# Patient Record
Sex: FEMALE | Race: WHITE | NOT HISPANIC OR LATINO
[De-identification: names, ages, dates, MRNs, and addresses within clinical notes are randomized per-mention and may not be internally consistent; named-entity substitution may affect disease eponyms.]

---

## 2019-06-11 PROBLEM — Z00.00 ENCOUNTER FOR PREVENTIVE HEALTH EXAMINATION: Status: ACTIVE | Noted: 2019-06-11

## 2019-06-26 ENCOUNTER — APPOINTMENT (OUTPATIENT)
Dept: NEUROLOGY | Facility: CLINIC | Age: 49
End: 2019-06-26

## 2019-07-31 ENCOUNTER — APPOINTMENT (OUTPATIENT)
Dept: NEUROLOGY | Facility: CLINIC | Age: 49
End: 2019-07-31
Payer: COMMERCIAL

## 2019-07-31 VITALS
DIASTOLIC BLOOD PRESSURE: 81 MMHG | HEIGHT: 62 IN | BODY MASS INDEX: 24.66 KG/M2 | WEIGHT: 134 LBS | TEMPERATURE: 98.2 F | SYSTOLIC BLOOD PRESSURE: 113 MMHG | HEART RATE: 81 BPM

## 2019-07-31 DIAGNOSIS — Z87.891 PERSONAL HISTORY OF NICOTINE DEPENDENCE: ICD-10-CM

## 2019-07-31 DIAGNOSIS — Z86.018 PERSONAL HISTORY OF OTHER BENIGN NEOPLASM: ICD-10-CM

## 2019-07-31 PROCEDURE — 99204 OFFICE O/P NEW MOD 45 MIN: CPT

## 2019-07-31 NOTE — DISCUSSION/SUMMARY
[FreeTextEntry1] : I  reviewed    the    recent   MRI of  the    brain,  which     was   nl  (done   in   2018)\par Would    like   too  refer    to   MRI    of    the   brain   with  and  without   contrast    (pituitary)\par Labs\par She    will  be  referred  to  Endocrinologist\par Paxil  (pt  is  tremaine-  menopausal,  reports     irritability)\par r/o  sz,  h/o    meningioma  resection.  Will  need    an   EEG\par

## 2019-07-31 NOTE — HISTORY OF PRESENT ILLNESS
[FreeTextEntry1] : Pt   presented    for     evaluation  of   recently   noticed   increased  Prolactin   level.  She    also  reports    irritability.   There    is  a  h/o    meningioma   resection   a    few    year s   ago.  No   seizures.  She    states    that prior    to    the   diagnosis    of   Meningioma    she    has  been    experiencing  an   episodes of     dizziness   and   memory  issues.  No   current   events     suspicious     for    seizures.

## 2019-07-31 NOTE — REVIEW OF SYSTEMS
[Dizziness] : dizziness [Tension Headache] : tension-type headaches [Palpitations] : palpitations [Negative] : Heme/Lymph [Joint Swelling] : joint swelling [Joint Pain] : joint pain [FreeTextEntry2] : night sweats [FreeTextEntry9] : neck pain,muscle aches,back pain [de-identified] : off balance [de-identified] : hair loss

## 2019-07-31 NOTE — PHYSICAL EXAM
[General Appearance - Alert] : alert [Oriented To Time, Place, And Person] : oriented to person, place, and time [General Appearance - In No Acute Distress] : in no acute distress [Affect] : the affect was normal [Impaired Insight] : insight and judgment were intact [Person] : oriented to person [Place] : oriented to place [Concentration Intact] : normal concentrating ability [Time] : oriented to time [Visual Intact] : visual attention was ~T not ~L decreased [Naming Objects] : no difficulty naming common objects [Repeating Phrases] : no difficulty repeating a phrase [Writing A Sentence] : no difficulty writing a sentence [Fluency] : fluency intact [Comprehension] : comprehension intact [Past History] : adequate knowledge of personal past history [Reading] : reading intact [Cranial Nerves Trigeminal (V)] : facial sensation intact symmetrically [Cranial Nerves Optic (II)] : visual acuity intact bilaterally,  visual fields full to confrontation, pupils equal round and reactive to light [Cranial Nerves Oculomotor (III)] : extraocular motion intact [Cranial Nerves Facial (VII)] : face symmetrical [Cranial Nerves Vestibulocochlear (VIII)] : hearing was intact bilaterally [Cranial Nerves Glossopharyngeal (IX)] : tongue and palate midline [Cranial Nerves Hypoglossal (XII)] : there was no tongue deviation with protrusion [Cranial Nerves Accessory (XI - Cranial And Spinal)] : head turning and shoulder shrug symmetric [Motor Tone] : muscle tone was normal in all four extremities [Motor Strength] : muscle strength was normal in all four extremities [No Muscle Atrophy] : normal bulk in all four extremities [Sensation Tactile Decrease] : light touch was intact [Abnormal Walk] : normal gait [Balance] : balance was intact [2+] : Ankle jerk left 2+ [Past-pointing] : there was no past-pointing [Tremor] : no tremor present [Plantar Reflex Right Only] : normal on the right [Plantar Reflex Left Only] : normal on the left

## 2019-08-01 LAB
PROLACTIN SERPL-MCNC: 25.9 NG/ML
TSH SERPL-ACNC: 2.18 UIU/ML

## 2019-08-09 ENCOUNTER — APPOINTMENT (OUTPATIENT)
Dept: NEUROLOGY | Facility: CLINIC | Age: 49
End: 2019-08-09
Payer: COMMERCIAL

## 2019-08-09 PROCEDURE — 95819 EEG AWAKE AND ASLEEP: CPT

## 2019-08-10 PROCEDURE — 95953: CPT

## 2019-08-11 PROCEDURE — 95953: CPT

## 2019-08-12 ENCOUNTER — APPOINTMENT (OUTPATIENT)
Dept: NEUROLOGY | Facility: CLINIC | Age: 49
End: 2019-08-12

## 2019-09-04 ENCOUNTER — APPOINTMENT (OUTPATIENT)
Dept: NEUROLOGY | Facility: CLINIC | Age: 49
End: 2019-09-04
Payer: COMMERCIAL

## 2019-09-04 VITALS
HEIGHT: 62 IN | HEART RATE: 94 BPM | SYSTOLIC BLOOD PRESSURE: 113 MMHG | BODY MASS INDEX: 24.66 KG/M2 | WEIGHT: 134 LBS | DIASTOLIC BLOOD PRESSURE: 71 MMHG | TEMPERATURE: 98.2 F

## 2019-09-04 PROCEDURE — 99215 OFFICE O/P EST HI 40 MIN: CPT

## 2019-09-04 NOTE — DISCUSSION/SUMMARY
[FreeTextEntry1] : I  reviewed  the   MRI of the  brain, EEG, labs   and  discussed  the  findings   with  the  patient.  Prolactin level  decreased.  There   is  no  evidence  of   pituitary   lesion on  MRI\par EEG is   nl\par Suspect  that    cognitive  issues  are  attributed    to  mood,,  now  improved\par Would   repeat   an MRI in 12  months,  prolactin    level  follow  up  in  3   months.

## 2019-09-04 NOTE — PHYSICAL EXAM
[General Appearance - Alert] : alert [General Appearance - In No Acute Distress] : in no acute distress [Oriented To Time, Place, And Person] : oriented to person, place, and time [Impaired Insight] : insight and judgment were intact [Affect] : the affect was normal [Person] : oriented to person [Place] : oriented to place [Time] : oriented to time [Concentration Intact] : normal concentrating ability [Visual Intact] : visual attention was ~T not ~L decreased [Naming Objects] : no difficulty naming common objects [Repeating Phrases] : no difficulty repeating a phrase [Writing A Sentence] : no difficulty writing a sentence [Fluency] : fluency intact [Comprehension] : comprehension intact [Reading] : reading intact [Past History] : adequate knowledge of personal past history [Cranial Nerves Optic (II)] : visual acuity intact bilaterally,  visual fields full to confrontation, pupils equal round and reactive to light [Cranial Nerves Oculomotor (III)] : extraocular motion intact [Cranial Nerves Trigeminal (V)] : facial sensation intact symmetrically [Cranial Nerves Vestibulocochlear (VIII)] : hearing was intact bilaterally [Cranial Nerves Facial (VII)] : face symmetrical [Cranial Nerves Glossopharyngeal (IX)] : tongue and palate midline [Cranial Nerves Accessory (XI - Cranial And Spinal)] : head turning and shoulder shrug symmetric [Cranial Nerves Hypoglossal (XII)] : there was no tongue deviation with protrusion [Motor Tone] : muscle tone was normal in all four extremities [Motor Strength] : muscle strength was normal in all four extremities [No Muscle Atrophy] : normal bulk in all four extremities [Sensation Tactile Decrease] : light touch was intact [Abnormal Walk] : normal gait [Balance] : balance was intact [2+] : Ankle jerk left 2+ [Past-pointing] : there was no past-pointing [Tremor] : no tremor present [Plantar Reflex Right Only] : normal on the right [Plantar Reflex Left Only] : normal on the left

## 2019-09-04 NOTE — HISTORY OF PRESENT ILLNESS
[FreeTextEntry1] : Presented   to     follow  up   test   results    and    further  management.   She    states  that  she    continues   experiencing    an   episodes   of     dizziness   and    occasional  memory   issues. She started    taking  Paxil  and   states     that   mood  is   much  better. Prolactin  level  is   25.9.  TSH  was   nl.\par MRI   of the   brain  on    08/09/2019  did  not  reveal   pituitary   or    hypothalamic   lesion.  Status  post   left   frontal   craniotomy.  There  is   no   recurrent   meningoma. Manjeet Castillo,  Radiologist. EEG (routine   and  amb)  were    normal.

## 2019-09-04 NOTE — PHYSICAL EXAM
[General Appearance - Alert] : alert [General Appearance - In No Acute Distress] : in no acute distress [Oriented To Time, Place, And Person] : oriented to person, place, and time [Impaired Insight] : insight and judgment were intact [Affect] : the affect was normal [Person] : oriented to person [Place] : oriented to place [Time] : oriented to time [Visual Intact] : visual attention was ~T not ~L decreased [Concentration Intact] : normal concentrating ability [Naming Objects] : no difficulty naming common objects [Repeating Phrases] : no difficulty repeating a phrase [Writing A Sentence] : no difficulty writing a sentence [Fluency] : fluency intact [Comprehension] : comprehension intact [Reading] : reading intact [Past History] : adequate knowledge of personal past history [Cranial Nerves Optic (II)] : visual acuity intact bilaterally,  visual fields full to confrontation, pupils equal round and reactive to light [Cranial Nerves Oculomotor (III)] : extraocular motion intact [Cranial Nerves Trigeminal (V)] : facial sensation intact symmetrically [Cranial Nerves Vestibulocochlear (VIII)] : hearing was intact bilaterally [Cranial Nerves Facial (VII)] : face symmetrical [Cranial Nerves Glossopharyngeal (IX)] : tongue and palate midline [Cranial Nerves Accessory (XI - Cranial And Spinal)] : head turning and shoulder shrug symmetric [Cranial Nerves Hypoglossal (XII)] : there was no tongue deviation with protrusion [Motor Strength] : muscle strength was normal in all four extremities [Motor Tone] : muscle tone was normal in all four extremities [Sensation Tactile Decrease] : light touch was intact [No Muscle Atrophy] : normal bulk in all four extremities [Balance] : balance was intact [Abnormal Walk] : normal gait [2+] : Ankle jerk left 2+ [Past-pointing] : there was no past-pointing [Tremor] : no tremor present [Plantar Reflex Right Only] : normal on the right [Plantar Reflex Left Only] : normal on the left

## 2020-01-08 ENCOUNTER — APPOINTMENT (OUTPATIENT)
Dept: NEUROLOGY | Facility: CLINIC | Age: 50
End: 2020-01-08
Payer: COMMERCIAL

## 2020-01-08 VITALS
HEART RATE: 80 BPM | BODY MASS INDEX: 25.76 KG/M2 | WEIGHT: 140 LBS | SYSTOLIC BLOOD PRESSURE: 98 MMHG | DIASTOLIC BLOOD PRESSURE: 73 MMHG | HEIGHT: 62 IN

## 2020-01-08 DIAGNOSIS — F33.9 MAJOR DEPRESSIVE DISORDER, RECURRENT, UNSPECIFIED: ICD-10-CM

## 2020-01-08 DIAGNOSIS — R45.4 IRRITABILITY AND ANGER: ICD-10-CM

## 2020-01-08 PROCEDURE — 99215 OFFICE O/P EST HI 40 MIN: CPT

## 2020-01-08 NOTE — PHYSICAL EXAM
[General Appearance - Alert] : alert [General Appearance - In No Acute Distress] : in no acute distress [Impaired Insight] : insight and judgment were intact [Oriented To Time, Place, And Person] : oriented to person, place, and time [Affect] : the affect was normal [Person] : oriented to person [Place] : oriented to place [Time] : oriented to time [Concentration Intact] : normal concentrating ability [Visual Intact] : visual attention was ~T not ~L decreased [Naming Objects] : no difficulty naming common objects [Repeating Phrases] : no difficulty repeating a phrase [Writing A Sentence] : no difficulty writing a sentence [Comprehension] : comprehension intact [Fluency] : fluency intact [Reading] : reading intact [Past History] : adequate knowledge of personal past history [Cranial Nerves Optic (II)] : visual acuity intact bilaterally,  visual fields full to confrontation, pupils equal round and reactive to light [Cranial Nerves Oculomotor (III)] : extraocular motion intact [Cranial Nerves Trigeminal (V)] : facial sensation intact symmetrically [Cranial Nerves Facial (VII)] : face symmetrical [Cranial Nerves Vestibulocochlear (VIII)] : hearing was intact bilaterally [Cranial Nerves Glossopharyngeal (IX)] : tongue and palate midline [Cranial Nerves Accessory (XI - Cranial And Spinal)] : head turning and shoulder shrug symmetric [Cranial Nerves Hypoglossal (XII)] : there was no tongue deviation with protrusion [Motor Tone] : muscle tone was normal in all four extremities [No Muscle Atrophy] : normal bulk in all four extremities [Motor Strength] : muscle strength was normal in all four extremities [Sensation Tactile Decrease] : light touch was intact [Abnormal Walk] : normal gait [Balance] : balance was intact [2+] : Ankle jerk left 2+ [Past-pointing] : there was no past-pointing [Tremor] : no tremor present [Plantar Reflex Right Only] : normal on the right [Plantar Reflex Left Only] : normal on the left

## 2020-01-08 NOTE — PHYSICAL EXAM
[General Appearance - Alert] : alert [General Appearance - In No Acute Distress] : in no acute distress [Impaired Insight] : insight and judgment were intact [Oriented To Time, Place, And Person] : oriented to person, place, and time [Person] : oriented to person [Affect] : the affect was normal [Place] : oriented to place [Concentration Intact] : normal concentrating ability [Time] : oriented to time [Visual Intact] : visual attention was ~T not ~L decreased [Naming Objects] : no difficulty naming common objects [Writing A Sentence] : no difficulty writing a sentence [Repeating Phrases] : no difficulty repeating a phrase [Comprehension] : comprehension intact [Fluency] : fluency intact [Past History] : adequate knowledge of personal past history [Reading] : reading intact [Cranial Nerves Trigeminal (V)] : facial sensation intact symmetrically [Cranial Nerves Optic (II)] : visual acuity intact bilaterally,  visual fields full to confrontation, pupils equal round and reactive to light [Cranial Nerves Oculomotor (III)] : extraocular motion intact [Cranial Nerves Glossopharyngeal (IX)] : tongue and palate midline [Cranial Nerves Facial (VII)] : face symmetrical [Cranial Nerves Vestibulocochlear (VIII)] : hearing was intact bilaterally [Cranial Nerves Accessory (XI - Cranial And Spinal)] : head turning and shoulder shrug symmetric [Cranial Nerves Hypoglossal (XII)] : there was no tongue deviation with protrusion [Motor Tone] : muscle tone was normal in all four extremities [No Muscle Atrophy] : normal bulk in all four extremities [Motor Strength] : muscle strength was normal in all four extremities [Abnormal Walk] : normal gait [Sensation Tactile Decrease] : light touch was intact [Balance] : balance was intact [2+] : Patella left 2+ [Past-pointing] : there was no past-pointing [Tremor] : no tremor present [Plantar Reflex Right Only] : normal on the right [Plantar Reflex Left Only] : normal on the left

## 2020-01-08 NOTE — PHYSICAL EXAM
[General Appearance - Alert] : alert [General Appearance - In No Acute Distress] : in no acute distress [Oriented To Time, Place, And Person] : oriented to person, place, and time [Impaired Insight] : insight and judgment were intact [Person] : oriented to person [Affect] : the affect was normal [Place] : oriented to place [Time] : oriented to time [Concentration Intact] : normal concentrating ability [Visual Intact] : visual attention was ~T not ~L decreased [Naming Objects] : no difficulty naming common objects [Writing A Sentence] : no difficulty writing a sentence [Repeating Phrases] : no difficulty repeating a phrase [Comprehension] : comprehension intact [Fluency] : fluency intact [Reading] : reading intact [Past History] : adequate knowledge of personal past history [Cranial Nerves Trigeminal (V)] : facial sensation intact symmetrically [Cranial Nerves Oculomotor (III)] : extraocular motion intact [Cranial Nerves Optic (II)] : visual acuity intact bilaterally,  visual fields full to confrontation, pupils equal round and reactive to light [Cranial Nerves Vestibulocochlear (VIII)] : hearing was intact bilaterally [Cranial Nerves Facial (VII)] : face symmetrical [Cranial Nerves Glossopharyngeal (IX)] : tongue and palate midline [Cranial Nerves Hypoglossal (XII)] : there was no tongue deviation with protrusion [Motor Tone] : muscle tone was normal in all four extremities [Cranial Nerves Accessory (XI - Cranial And Spinal)] : head turning and shoulder shrug symmetric [No Muscle Atrophy] : normal bulk in all four extremities [Motor Strength] : muscle strength was normal in all four extremities [Sensation Tactile Decrease] : light touch was intact [Abnormal Walk] : normal gait [Balance] : balance was intact [2+] : Ankle jerk left 2+ [Past-pointing] : there was no past-pointing [Tremor] : no tremor present [Plantar Reflex Right Only] : normal on the right [Plantar Reflex Left Only] : normal on the left

## 2020-01-08 NOTE — DISCUSSION/SUMMARY
[FreeTextEntry1] : It    appears    that  the    patient's  cognitive complaints  are    related    to the    mood  issues. She    reports     a  h/o  depression;  haven't  been    seen   by    the    mood    specialist    for    a  while.\par She    will  be    referred    to  Psychiatrist  and   Psychologist.  She    has   an  appointment     with   Endocrinologist.

## 2020-01-09 LAB — PROLACTIN SERPL-MCNC: 30.2 NG/ML

## 2020-02-14 ENCOUNTER — APPOINTMENT (OUTPATIENT)
Dept: NEUROLOGY | Facility: CLINIC | Age: 50
End: 2020-02-14
Payer: COMMERCIAL

## 2020-02-14 VITALS
SYSTOLIC BLOOD PRESSURE: 117 MMHG | HEIGHT: 62 IN | DIASTOLIC BLOOD PRESSURE: 80 MMHG | WEIGHT: 140 LBS | TEMPERATURE: 98 F | BODY MASS INDEX: 25.76 KG/M2 | HEART RATE: 73 BPM

## 2020-02-14 PROCEDURE — 99213 OFFICE O/P EST LOW 20 MIN: CPT

## 2020-02-14 NOTE — PHYSICAL EXAM
[FreeTextEntry1] : Physical examination \par General: No acute distress, Awake, Alert.   \par \par Mental status \par Awake, alert, appropriate.  Gives detailed history.\par \par Cranial Nerves \par II: VFF  \par III, IV, VI: PERRL, EOMI.   \par VII: Facial strength is normal B/L. \par VIII: Gross hearing is intact.   \par IX, X: Palate is midline and elevates symmetrically.   \par XI: Trapezius normal strength.   \par XII: Tongue midline without atrophy or fasciculations. \par \par Motor exam  \par Muscle tone - no evidence of rigidity or resistance in all 4 extremities.  \par No atrophy or fasciculations \par Muscle Strength: arms and legs, proximal and distal flexors and extensors are normal \par No UE drift.\par \par Reflexes \par diffuse hyporeflexia \par \par \par Coordination \par Finger to nose: Normal.  \par Heel to shin: Normal.   \par \par \par Gait \par Normal including heels, toes, and tandem gait.  \par \par \par

## 2020-02-14 NOTE — ASSESSMENT
[FreeTextEntry1] : Ms. Hatfield is a 49-year-old woman who had resection of a meningioma 5 years ago.  No recurrent meningioma on the most recent MRI brain with and without contrast performed in August of 2019. I recommend a consultation with neurosurgery to recommend how often she should have followup MRIs.  She has no history of seizures and has a normal routine and ambulatory EEG.\par \par Mildly elevated prolactin level with no galactorrhea and no abnormality in the pituitary on MRI. Endocrine consult.\par \par Depression and anxiety - she is seeing a psychiatrist.\par \par I spent over 15 minutes with the patient and over 50% of that time was spent counseling, coordination of care and answering questions.\par

## 2020-02-14 NOTE — DATA REVIEWED
[de-identified] : January 8, 2020 - Prolactin level 30.2\par August 9, 2019\par MRI brain and pituitary with and without contrast - there is no pituitary or hypothalamic lesion. Status post left frontal craniotomy. There is no recurrent meningioma.\par August 2019 routine and ambulatory EEG – normal.\par

## 2020-02-14 NOTE — HISTORY OF PRESENT ILLNESS
[FreeTextEntry1] : This is a followup visit for Ms. Hatfield who is a 49-year-old woman who had resection of a meningioma 5 years ago. She has never had a seizure.  She has depression and sees a psychiatrist. She has had an elevated prolactin level but no galactorrhea.\par \par She used to be a gymnast.

## 2020-02-14 NOTE — CONSULT LETTER
[Dear  ___] : Dear  [unfilled], [FreeTextEntry1] : I had the pleasure of evaluating your patient, SETH GUZMAN. Please see the assessment section below for a summary of my diagnostic impression and plan.\par \par Thank you very much for allowing me to participate in the care of this patient. If you have any questions, please do not hesitate to contact me. \par \par Sincerely,\par \par Jeniffer Wren MD\par

## 2020-02-20 ENCOUNTER — APPOINTMENT (OUTPATIENT)
Dept: ENDOCRINOLOGY | Facility: CLINIC | Age: 50
End: 2020-02-20
Payer: COMMERCIAL

## 2020-02-20 VITALS
HEIGHT: 62 IN | DIASTOLIC BLOOD PRESSURE: 68 MMHG | HEART RATE: 79 BPM | WEIGHT: 138 LBS | BODY MASS INDEX: 25.4 KG/M2 | OXYGEN SATURATION: 97 % | SYSTOLIC BLOOD PRESSURE: 98 MMHG

## 2020-02-20 DIAGNOSIS — E03.9 HYPOTHYROIDISM, UNSPECIFIED: ICD-10-CM

## 2020-02-20 DIAGNOSIS — E22.9 HYPERFUNCTION OF PITUITARY GLAND, UNSPECIFIED: ICD-10-CM

## 2020-02-20 PROCEDURE — 99203 OFFICE O/P NEW LOW 30 MIN: CPT | Mod: 25

## 2020-02-20 PROCEDURE — 36415 COLL VENOUS BLD VENIPUNCTURE: CPT

## 2020-02-27 NOTE — PHYSICAL EXAM
[Alert] : alert [No Acute Distress] : no acute distress [Thyroid Not Enlarged] : the thyroid was not enlarged [Healthy Appearance] : healthy appearance [Normal Voice/Communication] : normal voice communication [Regular Rhythm] : with a regular rhythm [No Involuntary Movements] : no involuntary movements were seen [No Rash] : no rash [Oriented x3] : oriented to person, place, and time [Normal Insight/Judgement] : insight and judgment were intact [No Tremors] : no tremors [Normal Mood] : the mood was normal [Normal Affect] : the affect was normal

## 2020-02-27 NOTE — ASSESSMENT
[FreeTextEntry1] : Serial observation and monitoring of prolactin levels will be strategy for now.  \par ROV 6 months

## 2020-02-27 NOTE — HISTORY OF PRESENT ILLNESS
[FreeTextEntry1] : Feb 20, 2020\par \par PCP:   Dr. Wilman Mcginnis\par           Gyn:  Oscar iSnclair\par           Neurology:  Dr. Jeniffer Wren\par           GI: colonoscopy 3 yrs ago  [facility] - polyps \par \par CC:  Elevated prolactin\par         (Hx of L temporoparietal craniotomy for meningioma - [facility] - [address] -~2015) \par \par 48 yo mother of two (9 and 10), .       About June 2019 diagnosed by Gyn with elevated prolactin   ~42, 56, 51\par On Paxil  \par At [facility] July 2019   prolactin 25.9 (normal up to to 24) and repeat 30 in Januayr 2020.\par LMP last month.  Irregular.   July TSH  2.18      MRI 2019 - negative pituitary\par \par Impression:  Careful evaluation indicates hyperprolactinemia.   Reassuring MRI.  No evidence of hypothyroidism at this time.\par Serial observation will be the strategy for now.\par ROV six months.\par Thank you.    \par \par \par \par \par

## 2020-03-01 LAB
THYROGLOB AB SERPL-ACNC: <20 IU/ML
THYROPEROXIDASE AB SERPL IA-ACNC: <10 IU/ML
TSH SERPL-ACNC: 1.94 UIU/ML

## 2020-03-16 ENCOUNTER — APPOINTMENT (OUTPATIENT)
Dept: NEUROSURGERY | Facility: CLINIC | Age: 50
End: 2020-03-16

## 2020-06-12 ENCOUNTER — APPOINTMENT (OUTPATIENT)
Dept: NEUROLOGY | Facility: CLINIC | Age: 50
End: 2020-06-12
Payer: COMMERCIAL

## 2020-06-12 PROCEDURE — 99212 OFFICE O/P EST SF 10 MIN: CPT | Mod: 95

## 2020-06-12 NOTE — REASON FOR VISIT
[Home] : at home, [unfilled] , at the time of the visit. [Medical Office: ([facility])___] : at the medical office located in  [Verbal consent obtained from patient] : the patient, [unfilled]

## 2020-06-12 NOTE — PHYSICAL EXAM
[FreeTextEntry1] : Physical examination \par General: No acute distress, Awake, Alert.   \par \par Mental status \par Awake, alert, appropriate.  Gives detailed history.\par \par \par \par \par \par

## 2020-06-12 NOTE — HISTORY OF PRESENT ILLNESS
[FreeTextEntry1] : Ms. Hatfield is a 50 year old woman with a resection of a meningioma 5 years ago.  She has not followed up with neurosurgery as of yet due to the COVID-19 pandemic. She has not had a seizure currently or in the past.  She is on Paxil 20mg for depression and reports weight gain and sees a psychiatrist.  \par \par She also had an elevated prolactin level but no galactorrhea and is being followed by endocrinology. \par She has no other new neurological complaints.

## 2020-06-12 NOTE — ASSESSMENT
[FreeTextEntry1] : Ms. Hatfield is a 50-year-old woman who had resection of a meningioma 5 years ago.  No recurrent meningioma on the most recent MRI brain with and without contrast performed in August of 2019. I recommend a consultation with neurosurgery to recommend how often she should have followup MRIs.  She has no history of seizures and had a normal routine and ambulatory EEG.\par \par Mildly elevated prolactin level with no galactorrhea and no abnormality in the pituitary on MRI. Endocrine following.\par \par Depression and anxiety - she is seeing a psychiatrist.\par \par Follow up in one year. \par \par

## 2020-06-12 NOTE — DATA REVIEWED
[de-identified] : 3/1/20: TSH 1.94\par January 8, 2020 - Prolactin level 30.2\par August 9, 2019\par MRI brain and pituitary with and without contrast - there is no pituitary or hypothalamic lesion. Status post left frontal craniotomy. There is no recurrent meningioma.\par August 2019 routine and ambulatory EEG – normal.\par

## 2020-07-01 ENCOUNTER — APPOINTMENT (OUTPATIENT)
Dept: NEUROSURGERY | Facility: CLINIC | Age: 50
End: 2020-07-01

## 2020-07-08 ENCOUNTER — APPOINTMENT (OUTPATIENT)
Dept: NEUROSURGERY | Facility: CLINIC | Age: 50
End: 2020-07-08
Payer: COMMERCIAL

## 2020-07-08 VITALS
SYSTOLIC BLOOD PRESSURE: 116 MMHG | HEART RATE: 79 BPM | HEIGHT: 62 IN | DIASTOLIC BLOOD PRESSURE: 77 MMHG | BODY MASS INDEX: 25.76 KG/M2 | WEIGHT: 140 LBS | TEMPERATURE: 98 F

## 2020-07-08 PROCEDURE — 99205 OFFICE O/P NEW HI 60 MIN: CPT

## 2020-07-08 NOTE — HISTORY OF PRESENT ILLNESS
[de-identified] : 50 YOF history of  Left Frontal Craniotomy  on 6/16/2015 at [facility] by Dr Ricci Sun comes to office today for Neurosurgical follow up . She has had serial MRIs of the brain the last one in 2019 at [facility]. She was followed by Dr Wren Neurology via telehealth and advised to see us in regards to follow up imaging of the brain. She is on Paxil for depression denies headaches, seizures, visual changes, weakness in arms or legs head trauma or balance issues.  She is right hand dominant and works as a .

## 2020-07-08 NOTE — END OF VISIT
[FreeTextEntry3] : I have seen the patient and reviewed the case together with PA and I agree with the final recommendations and plan of care.\par \par Pablo Montiel MD\par Neurosurgery\par \par  [Time Spent: ___ minutes] : I have spent [unfilled] minutes of time on the encounter. [>50% of the face to face encounter time was spent on counseling and/or coordination of care for ___] : Greater than 50% of the face to face encounter time was spent on counseling and/or coordination of care for [unfilled]

## 2020-08-03 ENCOUNTER — APPOINTMENT (OUTPATIENT)
Dept: NEUROSURGERY | Facility: CLINIC | Age: 50
End: 2020-08-03

## 2020-08-26 ENCOUNTER — APPOINTMENT (OUTPATIENT)
Dept: NEUROSURGERY | Facility: CLINIC | Age: 50
End: 2020-08-26
Payer: COMMERCIAL

## 2020-08-26 PROCEDURE — 99214 OFFICE O/P EST MOD 30 MIN: CPT

## 2020-08-26 NOTE — DATA REVIEWED
[de-identified] : brain +/- 8/25/20 at [facility] c/w 8/9/2019  shows s/p left frontal craniotomy no evidence of recurrent meningioma

## 2020-08-26 NOTE — PHYSICAL EXAM
[Span Intact] : the attention span was normal [Person] : oriented to person [Time] : oriented to time [Place] : oriented to place [Concentration Intact] : normal concentrating ability [Remote Intact] : remote memory intact [Fluency] : fluency intact [I: Normal Smell] : smell intact bilaterally [Cranial Nerves Optic (II)] : visual acuity intact bilaterally,  pupils equal round and reactive to light [Cranial Nerves Oculomotor (III)] : extraocular motion intact [Cranial Nerves Facial (VII)] : face symmetrical [Cranial Nerves Trigeminal (V)] : facial sensation intact symmetrically [Cranial Nerves Glossopharyngeal (IX)] : tongue and palate midline [Cranial Nerves Vestibulocochlear (VIII)] : hearing was intact bilaterally [Cranial Nerves Accessory (XI - Cranial And Spinal)] : head turning and shoulder shrug symmetric [Cranial Nerves Hypoglossal (XII)] : there was no tongue deviation with protrusion [Motor Strength] : muscle strength was normal in all four extremities [Motor Tone] : muscle tone was normal in all four extremities [Involuntary Movements] : no involuntary movements were seen [No Muscle Atrophy] : normal bulk in all four extremities [Sensation Tactile Decrease] : light touch was intact [Abnormal Walk] : normal gait

## 2020-08-26 NOTE — HISTORY OF PRESENT ILLNESS
[FreeTextEntry1] : Ms Hatfield is a 50 YOF who comes to the office today for neurosurgical evaluation and imaging review. She has history of left Frontal Craniotomy for meningioma on 6/6/2015 at [facility] by Dr Ricci Sun. She is on Zoloft for depression denies headaches, seizures, visual changes, weakness in arms or legs, head trauma or balance issues. She is right hand dominant and a .

## 2020-08-26 NOTE — HISTORY OF PRESENT ILLNESS
[FreeTextEntry1] : Ms Hatfield is a 50 YOF who comes to the office today for neurosurgical evaluation and imaging review. She has history of left Frontal Craniotomy for meningioma on 6/6/2015 at [facility] by Dr Ricci Sun. She is on Zoloft for depression denies headaches, seizures, visual changes, weakness in arms or legs, head trauma or balance issues. She is right hand dominant and a .

## 2020-08-26 NOTE — PHYSICAL EXAM
[Span Intact] : the attention span was normal [Person] : oriented to person [Time] : oriented to time [Place] : oriented to place [Concentration Intact] : normal concentrating ability [Remote Intact] : remote memory intact [Fluency] : fluency intact [I: Normal Smell] : smell intact bilaterally [Cranial Nerves Oculomotor (III)] : extraocular motion intact [Cranial Nerves Optic (II)] : visual acuity intact bilaterally,  pupils equal round and reactive to light [Cranial Nerves Trigeminal (V)] : facial sensation intact symmetrically [Cranial Nerves Facial (VII)] : face symmetrical [Cranial Nerves Glossopharyngeal (IX)] : tongue and palate midline [Cranial Nerves Vestibulocochlear (VIII)] : hearing was intact bilaterally [Cranial Nerves Accessory (XI - Cranial And Spinal)] : head turning and shoulder shrug symmetric [Cranial Nerves Hypoglossal (XII)] : there was no tongue deviation with protrusion [Motor Tone] : muscle tone was normal in all four extremities [Motor Strength] : muscle strength was normal in all four extremities [Involuntary Movements] : no involuntary movements were seen [No Muscle Atrophy] : normal bulk in all four extremities [Sensation Tactile Decrease] : light touch was intact [Abnormal Walk] : normal gait

## 2020-08-26 NOTE — DATA REVIEWED
[de-identified] : brain +/- 8/25/20 at [facility] c/w 8/9/2019  shows s/p left frontal craniotomy no evidence of recurrent meningioma

## 2020-10-21 NOTE — HISTORY OF PRESENT ILLNESS
[FreeTextEntry1] : This is a  49  y.o.   female  who presented    to   follow  up   on the     cognitive   issues.  Today  she   states    that    her    memory  issues    has gotten   worse  and   she    feels    extremely  depressed.   She    quit   her   job.  She    states    that    she    is   extremely    irritable. She    states     that    she    has been    taking  Paxil   on the    regular    basis. 
hair removal not indicated

## 2021-03-10 NOTE — PHYSICAL EXAM
[Person] : oriented to person [Short Term Intact] : short term memory intact [Time] : oriented to time [Place] : oriented to place [Fluency] : fluency intact [Concentration Intact] : normal concentrating ability [Span Intact] : the attention span was normal [Current Events] : adequate knowledge of current events [Sensation Tactile Decrease] : light touch was intact [Motor Strength] : muscle strength was normal in all four extremities [Involuntary Movements] : no involuntary movements were seen [Abnormal Walk] : normal gait [Balance] : balance was intact [2+] : Ankle jerk right 2+ [FreeTextEntry5] : II-XII intact [FreeTextEntry6] : UE/LE motor 5/5 [FreeTextEntry1] : Head left frontal surgical scar well healed [New Patient Visit] : a new patient visit [Referred By: _________] : Patient was referred by SYMONE

## 2021-06-11 ENCOUNTER — APPOINTMENT (OUTPATIENT)
Dept: NEUROLOGY | Facility: CLINIC | Age: 51
End: 2021-06-11
Payer: COMMERCIAL

## 2021-06-11 VITALS
HEIGHT: 62 IN | SYSTOLIC BLOOD PRESSURE: 110 MMHG | BODY MASS INDEX: 26.68 KG/M2 | WEIGHT: 145 LBS | TEMPERATURE: 97.5 F | HEART RATE: 81 BPM | DIASTOLIC BLOOD PRESSURE: 71 MMHG

## 2021-06-11 PROCEDURE — 99213 OFFICE O/P EST LOW 20 MIN: CPT

## 2021-06-11 RX ORDER — SERTRALINE HYDROCHLORIDE 25 MG/1
TABLET, FILM COATED ORAL
Refills: 0 | Status: ACTIVE | COMMUNITY

## 2021-06-11 RX ORDER — PAROXETINE HYDROCHLORIDE 20 MG/1
20 TABLET, FILM COATED ORAL
Qty: 30 | Refills: 5 | Status: DISCONTINUED | COMMUNITY
Start: 2019-07-31 | End: 2021-06-11

## 2021-06-14 NOTE — PHYSICAL EXAM
[FreeTextEntry1] : Physical examination \par General: No acute distress, Awake, Alert.   \par \par Mental status \par Awake, alert, gives detailed history. \par \par Cranial Nerves \par II: VFF  \par III, IV, VI: PERRL, EOMI.   \par V: Facial sensation is normal B/L.   \par VII: Facial strength is normal B/L. \par \par \par VIII: Gross hearing is intact.   \par \par \par IX, X: Palate is midline and elevates symmetrically.   \par XI: Trapezius normal strength.   \par XII: Tongue midline without atrophy or fasciculations. \par \par Motor exam  \par Muscle tone - no evidence of rigidity or resistance in all 4 extremities.  \par No atrophy or fasciculations \par Muscle Strength: arms and legs, proximal and distal flexors and extensors are normal \par \par No UE drift.\par \par Reflexes \par All present, normal, and symmetrical.   \par \par Plantars right: mute.   \par Plantars left: mute.   \par \par Coordination \par Finger to nose: Normal.  \par Heel to shin: Normal.   \par \par Sensory \par Intact sensation to vibration and cold.\par \par Gait \par Normal including heels, toes, and tandem gait.  \par \par  \par \par \par \par \par \par \par

## 2021-06-14 NOTE — DATA REVIEWED
[de-identified] : 3/1/20: TSH 1.94\par January 8, 2020 - Prolactin level 30.2\par August 9, 2019\par MRI brain and pituitary with and without contrast - there is no pituitary or hypothalamic lesion. Status post left frontal craniotomy. There is no recurrent meningioma.\par August 2019 routine and ambulatory EEG – normal.\par

## 2021-06-14 NOTE — ASSESSMENT
[FreeTextEntry1] : Ms. Hatfield is a 51-year-old woman who had resection of a meningioma over 5 years ago.  No recurrent meningioma on the most recent MRI brain with and without contrast performed in August of 2020.\par She has no history of seizures and had a normal routine and ambulatory EEG.\par \par Serial MRIs as per neurosurgery. \par \par Mildly elevated prolactin level with no galactorrhea and no abnormality in the pituitary on MRI previously. Endocrine following.\par \par Depression and anxiety - she is seeing a psychiatrist. Currently on Zoloft. \par \par Follow up PRN.\par \par I discussed in detail with the patient  the diagnosis, prognosis, treatment plan and answered all of her questions.\par \par

## 2021-06-14 NOTE — CONSULT LETTER
[Dear  ___] : Dear  [unfilled], [FreeTextEntry1] : I had the pleasure of evaluating your patient, SETH GUZMAN. Please see the assessment section below for a summary of my diagnostic impression and plan.\par \par Thank you very much for allowing me to participate in the care of this patient. If you have any questions, please do not hesitate to contact me. \par \par Sincerely,\par \par Jeniffer Wren MD\par Natalie Angel N.P.\par

## 2021-06-14 NOTE — HISTORY OF PRESENT ILLNESS
[FreeTextEntry1] : Ms. aHtfield is a 51 year old woman with a resection of a meningioma 5 years ago.  \par \par She saw neurosurgery and is due for a follow up MRI next year. She denies any seizure activity, headaches, visual changes, balance difficulties, or new neurological symptoms.\par \par Ms. Hatfield recently started Zoloft for depression.\par \par She has an elevated prolactin level and will follow up with Dr. Hellerman. \par \par The remaining neurological review of systems is negative. \par \par 6/12/20\par Ms. Hatfield is a 50 year old woman with a resection of a meningioma 5 years ago.  She has not followed up with neurosurgery as of yet due to the COVID-19 pandemic. She has not had a seizure currently or in the past.  She is on Paxil 20mg for depression and reports weight gain and sees a psychiatrist.  \par \par She also had an elevated prolactin level but no galactorrhea and is being followed by endocrinology. \par She has no other new neurological complaints.

## 2022-08-09 ENCOUNTER — RESULT REVIEW (OUTPATIENT)
Age: 52
End: 2022-08-09

## 2022-08-15 ENCOUNTER — NON-APPOINTMENT (OUTPATIENT)
Age: 52
End: 2022-08-15

## 2022-08-15 ENCOUNTER — APPOINTMENT (OUTPATIENT)
Dept: NEUROSURGERY | Facility: CLINIC | Age: 52
End: 2022-08-15

## 2022-08-15 VITALS
HEIGHT: 62 IN | WEIGHT: 145 LBS | HEART RATE: 80 BPM | TEMPERATURE: 98.7 F | DIASTOLIC BLOOD PRESSURE: 77 MMHG | OXYGEN SATURATION: 96 % | SYSTOLIC BLOOD PRESSURE: 113 MMHG | BODY MASS INDEX: 26.68 KG/M2

## 2022-08-15 DIAGNOSIS — Z86.018 PERSONAL HISTORY OF OTHER BENIGN NEOPLASM: ICD-10-CM

## 2022-08-15 PROCEDURE — 99215 OFFICE O/P EST HI 40 MIN: CPT

## 2022-08-15 NOTE — HISTORY OF PRESENT ILLNESS
[FreeTextEntry1] : SETH GUZMAN is a 52 year old right handed female with a PMH of  depression and left frontal craniotomy for meningioma resection by Dr. Ricci Sun at [place] [id number] who presents to the office today for 2 year follow up and imaging review. The patient has undergone imaging in the form of MRI brain +/-  at [facility] on 8/9/22 which revealed no residual tumor growth. The patient denies seizures, headaches, N/V, hearing or visual deficits, numbness, weakness, bowel/bladder dysfunction or gait disturbance. \par \par

## 2022-08-15 NOTE — PHYSICAL EXAM
[General Appearance - Alert] : alert [General Appearance - In No Acute Distress] : in no acute distress [Oriented To Time, Place, And Person] : oriented to person, place, and time [Impaired Insight] : insight and judgment were intact [Affect] : the affect was normal [Person] : oriented to person [Place] : oriented to place [Time] : oriented to time [Short Term Intact] : short term memory intact [Remote Intact] : remote memory intact [Span Intact] : the attention span was normal [Concentration Intact] : normal concentrating ability [Fluency] : fluency intact [Comprehension] : comprehension intact [Current Events] : adequate knowledge of current events [Past History] : adequate knowledge of personal past history [Vocabulary] : adequate range of vocabulary [Cranial Nerves Optic (II)] : visual acuity intact bilaterally,  pupils equal round and reactive to light [Cranial Nerves Oculomotor (III)] : extraocular motion intact [Cranial Nerves Trigeminal (V)] : facial sensation intact symmetrically [Cranial Nerves Facial (VII)] : face symmetrical [Cranial Nerves Vestibulocochlear (VIII)] : hearing was intact bilaterally [Cranial Nerves Glossopharyngeal (IX)] : tongue and palate midline [Cranial Nerves Accessory (XI - Cranial And Spinal)] : head turning and shoulder shrug symmetric [Motor Tone] : muscle tone was normal in all four extremities [Cranial Nerves Hypoglossal (XII)] : there was no tongue deviation with protrusion [Motor Strength] : muscle strength was normal in all four extremities [No Muscle Atrophy] : normal bulk in all four extremities [Sensation Tactile Decrease] : light touch was intact [Abnormal Walk] : normal gait [Balance] : balance was intact [2+] : Patella left 2+ [Past-pointing] : there was no past-pointing [Tremor] : no tremor present

## 2022-08-15 NOTE — ASSESSMENT
[FreeTextEntry1] : Ms. Hatfield is doing well 7 years s/p left frontal craniotomy for meningioma resection. She is asymptomatic and there is no residual tumor growth on her recent imaging. She can follow up with us with repeat MRI Brain +/- in 2 years. The patient understands the plan of care and is in agreement.  All questions answered to patient satisfaction.

## 2022-08-15 NOTE — DATA REVIEWED
[de-identified] : Exam: MRI BRAIN [facility] \par Order#: [id number] \par \par \par \par PROCEDURE: MRI Brain with and without contrast \par \par  INDICATION: Follow-up meningioma. History of left frontal craniotomy for resection of a meningioma. \par \par  TECHNIQUE: Multiplanar multi sequential MR images the brain were obtained before and after the \par administration of 7 mL IV Gadavist. \par \par  COMPARISON: None \par \par  FINDINGS: \par \par  Status post left frontal craniotomy. There is no evidence of nodular enhancement within the left \par frontal convexity to suggest residual or recurrent meningioma. There are no pathologic enhancing \par lesions. \par \par  The ventricles and sulci are normal in size and configuration. \par \par  There is no mass effect, midline shift or extra-axial collection. There is no acute hemorrhage. \par \par  No abnormal signal is identified within the brain parenchyma. The diffusion-weighted images \par demonstrate no recent infarction. \par \par  There is no susceptibility related signal loss to suggest hemosiderin deposition or calcification. \par \par  The vascular flow voids are present. \par \par  The sella and suprasellar regions are unremarkable. \par \par  The visualized paranasal sinuses are free of mucosal disease. The mastoid air cells are well-\par aerated. \par \par \par  IMPRESSION: \par \par  Status post left frontal craniotomy. No evidence of nodular enhancement to suggest residual \par recurrent meningioma. Prior imaging is not available for direct comparison. \par \par  --- End of Report --- \par \par ***Electronically Signed *** \par ----------------------------------------------- \par Adalberto Hutton MD 08/12/22 1614 \par \par Dictated on 08/12/22 \par \par \par Report cc: Elvia Christiansen NP;

## 2024-01-23 DIAGNOSIS — D32.0 BENIGN NEOPLASM OF CEREBRAL MENINGES: ICD-10-CM

## 2024-07-31 ENCOUNTER — APPOINTMENT (OUTPATIENT)
Dept: NEUROSURGERY | Facility: CLINIC | Age: 54
End: 2024-07-31

## 2024-09-04 ENCOUNTER — RESULT REVIEW (OUTPATIENT)
Age: 54
End: 2024-09-04

## 2024-09-13 NOTE — PHYSICAL EXAM
[General Appearance - Alert] : alert [General Appearance - In No Acute Distress] : in no acute distress [Oriented To Time, Place, And Person] : oriented to person, place, and time [Impaired Insight] : insight and judgment were intact [Affect] : the affect was normal [Comprehension] : comprehension intact [Fluency] : fluency intact [Cranial Nerves Optic (II)] : visual acuity intact bilaterally,  pupils equal round and reactive to light [Cranial Nerves Oculomotor (III)] : extraocular motion intact [Cranial Nerves Trigeminal (V)] : facial sensation intact symmetrically [Cranial Nerves Facial (VII)] : face symmetrical [Cranial Nerves Vestibulocochlear (VIII)] : hearing was intact bilaterally [Cranial Nerves Glossopharyngeal (IX)] : tongue and palate midline [Cranial Nerves Accessory (XI - Cranial And Spinal)] : head turning and shoulder shrug symmetric [Cranial Nerves Hypoglossal (XII)] : there was no tongue deviation with protrusion [Motor Strength] : muscle strength was normal in all four extremities [Sensation Tactile Decrease] : light touch was intact [Abnormal Walk] : normal gait [Balance] : balance was intact

## 2024-09-13 NOTE — PHYSICAL EXAM
[General Appearance - Alert] : alert [General Appearance - In No Acute Distress] : in no acute distress [Oriented To Time, Place, And Person] : oriented to person, place, and time [Impaired Insight] : insight and judgment were intact [Affect] : the affect was normal [Fluency] : fluency intact [Comprehension] : comprehension intact [Cranial Nerves Optic (II)] : visual acuity intact bilaterally,  pupils equal round and reactive to light [Cranial Nerves Oculomotor (III)] : extraocular motion intact [Cranial Nerves Trigeminal (V)] : facial sensation intact symmetrically [Cranial Nerves Vestibulocochlear (VIII)] : hearing was intact bilaterally [Cranial Nerves Facial (VII)] : face symmetrical [Cranial Nerves Glossopharyngeal (IX)] : tongue and palate midline [Cranial Nerves Accessory (XI - Cranial And Spinal)] : head turning and shoulder shrug symmetric [Cranial Nerves Hypoglossal (XII)] : there was no tongue deviation with protrusion [Motor Strength] : muscle strength was normal in all four extremities [Sensation Tactile Decrease] : light touch was intact [Abnormal Walk] : normal gait [Balance] : balance was intact

## 2024-09-16 ENCOUNTER — APPOINTMENT (OUTPATIENT)
Dept: NEUROSURGERY | Facility: CLINIC | Age: 54
End: 2024-09-16
Payer: COMMERCIAL

## 2024-09-16 VITALS
DIASTOLIC BLOOD PRESSURE: 76 MMHG | HEIGHT: 62 IN | OXYGEN SATURATION: 96 % | SYSTOLIC BLOOD PRESSURE: 114 MMHG | HEART RATE: 76 BPM

## 2024-09-16 DIAGNOSIS — D32.0 BENIGN NEOPLASM OF CEREBRAL MENINGES: ICD-10-CM

## 2024-09-16 PROCEDURE — 99215 OFFICE O/P EST HI 40 MIN: CPT

## 2024-09-16 PROCEDURE — G2211 COMPLEX E/M VISIT ADD ON: CPT

## 2024-09-16 NOTE — HISTORY OF PRESENT ILLNESS
[FreeTextEntry1] : Ms. Hatfield returns to the office today for interval 2 year follow up and imaging review for her previously resected left frontal meningioma.  The patient has undergone imaging in the form of MRI brain +/- at [facility] on 9/6/24 which I have independently reviewed and which revealed no residual or recurrent tumor growth. The patient denies seizures, headaches, N/V, hearing or visual deficits, numbness, weakness, bowel/bladder dysfunction or gait disturbance.  She has been doing well and denies any changes to her medical history since prior visit.   8/15/22: SETH HATFIELD is a 52 year old right handed female with a PMH of depression and left frontal craniotomy for meningioma resection by Dr. Ricci Sun at [place] [address] who presents to the office today for 2 year follow up and imaging review. The patient has undergone imaging in the form of MRI brain +/- at [facility] on 8/9/22 which revealed no residual tumor growth. The patient denies seizures, headaches, N/V, hearing or visual deficits, numbness, weakness, bowel/bladder dysfunction or gait disturbance.

## 2024-09-16 NOTE — END OF VISIT
[FreeTextEntry3] : I have seen the patient and reviewed the case together with PA and I agree with the final recommendations and plan of care.  Pablo Montiel MD Neurosurgery  [Time Spent: ___ minutes] : I have spent [unfilled] minutes of time on the encounter which excludes teaching and separately reported services.

## 2024-09-16 NOTE — HISTORY OF PRESENT ILLNESS
[FreeTextEntry1] : Ms. Hatfield returns to the office today for interval 2 year follow up and imaging review for her previously resected left frontal meningioma.  The patient has undergone imaging in the form of MRI brain +/- at [facility] on 9/6/24 which I have independently reviewed and which revealed no residual or recurrent tumor growth. The patient denies seizures, headaches, N/V, hearing or visual deficits, numbness, weakness, bowel/bladder dysfunction or gait disturbance.  She has been doing well and denies any changes to her medical history since prior visit.   8/15/22: SETH HATFIELD is a 52 year old right handed female with a PMH of depression and left frontal craniotomy for meningioma resection by Dr. Ricci Sun at [place] [address] who presents to the office today for 2 year follow up and imaging review. The patient has undergone imaging in the form of MRI brain +/- at [facility] on 8/9/22 which revealed no residual tumor growth. The patient denies seizures, headaches, N/V, hearing or visual deficits, numbness, weakness, bowel/bladder dysfunction or gait disturbance.     Statement Selected

## 2024-09-16 NOTE — DATA REVIEWED
[de-identified] : Exam Date:      09/04/24 Exam:         MRI BRAIN [facility] Order#:       MRI [id number]    Follow-up for meningioma.   Technique: MRI of the brain was performed utilizing sagittal and axial T1, axial T2, axial gradient echo, axial and coronal FLAIR and diffusion imaging. Following the administration of 7 cc of Gadavist (0.5 cc discarded), axial sagittal and coronal reformatted T1 MP rage images were obtained.   Comparison is made to a prior MRI of the brain performed on 8/9/2022.   Findings: The patient is status post left frontal craniotomy, unchanged. There are several scattered punctate foci of T2 and Flair signal hyperintensities within the white matter that are non specific. There is no evidence of mass-effect or midline shift. There is no evidence of intra or extra-axial fluid collection. The ventricles are of normal size and caliber. There is no evidence of acute infarction. Evaluation of the intracranial vascular flow-voids appear within normal limits.There is no evidence of abnormal enhancement to suggest residual or recurrent meningioma   There is moderate circumferential polypoid mucosal thickening of the right maxillary sinus. There is mild bilateral ethmoid mucosal thickening. . The remaining visualized paranasal sinuses and bilateral mastoid air cells are clear.   Impression: Status post left frontal craniotomy. No evidence of abnormal enhancement to suggest residual or recurrent meningioma.  No evidence of acute infarction. Sinus inflammatory disease.   --- End of Report ---  ***Electronically Signed *** ----------------------------------------------- Kay Vazquez MD              09/06/24 1734  Dictated on 09/06/24   Report cc:  Jessica Dooley;

## 2024-09-16 NOTE — DATA REVIEWED
[de-identified] : Exam Date:      09/04/24 Exam:         MRI BRAIN [facility] Order#:       MRI [id number]    Follow-up for meningioma.   Technique: MRI of the brain was performed utilizing sagittal and axial T1, axial T2, axial gradient echo, axial and coronal FLAIR and diffusion imaging. Following the administration of 7 cc of Gadavist (0.5 cc discarded), axial sagittal and coronal reformatted T1 MP rage images were obtained.   Comparison is made to a prior MRI of the brain performed on 8/9/2022.   Findings: The patient is status post left frontal craniotomy, unchanged. There are several scattered punctate foci of T2 and Flair signal hyperintensities within the white matter that are non specific. There is no evidence of mass-effect or midline shift. There is no evidence of intra or extra-axial fluid collection. The ventricles are of normal size and caliber. There is no evidence of acute infarction. Evaluation of the intracranial vascular flow-voids appear within normal limits.There is no evidence of abnormal enhancement to suggest residual or recurrent meningioma   There is moderate circumferential polypoid mucosal thickening of the right maxillary sinus. There is mild bilateral ethmoid mucosal thickening. . The remaining visualized paranasal sinuses and bilateral mastoid air cells are clear.   Impression: Status post left frontal craniotomy. No evidence of abnormal enhancement to suggest residual or recurrent meningioma.  No evidence of acute infarction. Sinus inflammatory disease.   --- End of Report ---  ***Electronically Signed *** ----------------------------------------------- Kay Vazquez MD              09/06/24 1734  Dictated on 09/06/24   Report cc:  Jessica Dooley;